# Patient Record
Sex: MALE
[De-identification: names, ages, dates, MRNs, and addresses within clinical notes are randomized per-mention and may not be internally consistent; named-entity substitution may affect disease eponyms.]

---

## 2020-11-20 ENCOUNTER — NURSE TRIAGE (OUTPATIENT)
Dept: OTHER | Facility: CLINIC | Age: 65
End: 2020-11-20

## 2020-11-20 NOTE — TELEPHONE ENCOUNTER
Patient called in via the [address] [facility] to report symptoms of bilateral leg/ankle injury. States he has spoken with his PCP and he has directed the patient to go to ER for further evaluation at this time. Duplicate call, call not triaged. Attention provider: Your patient utilized nurse triage services offered by employer, payer or community. This encounter includes an overview of the reason for call, assessment and recommended disposition. Please do not respond through this encounter as the response is not directed to a shared pool.         Reason for Disposition   Caller has already spoken with the PCP (or office), and has no further questions    Protocols used: NO CONTACT OR DUPLICATE CONTACT CALL-ADULT-[place]

## 2021-08-08 ENCOUNTER — NURSE TRIAGE (OUTPATIENT)
Dept: OTHER | Facility: CLINIC | Age: 66
End: 2021-08-08

## 2021-08-09 NOTE — TELEPHONE ENCOUNTER
Reason for Disposition   [1] Systolic BP  >= 162 OR Diastolic >= 989 AND [7] cardiac or neurologic symptoms (e.g., chest pain, difficulty breathing, unsteady gait, blurred vision)    Answer Assessment - Initial Assessment Questions  1. BLOOD PRESSURE: \"What is the blood pressure? \" \"Did you take at least two measurements 5 minutes apart?\"  237/182 a few minutes ago  and 166/103 1-2 hours ago    2. ONSET: \"When did you take your blood pressure? \"   a few minutes ago    3. HOW: \"How did you obtain the blood pressure? \" (e.g., visiting nurse, automatic home BP monitor)  Home monitor    4. HISTORY: \"Do you have a history of high blood pressure? \"  Yes    5. MEDICATIONS: Farnaz Mayorga you taking any medications for blood pressure? \" \"Have you missed any doses recently? \"   amlodipine, no missed doses, but does was increased from 5mg to 10mg by PCP due to increased BP    6. OTHER SYMPTOMS: \"Do you have any symptoms? \" (e.g., headache, chest pain, blurred vision, difficulty breathing, weakness)    Headache    7. PREGNANCY: \"Is there any chance you are pregnant? \" \"When was your last menstrual period? \"  n/a    Protocols used: HIGH BLOOD PRESSURE-ADULT-    Brief description of triage: elevated BP, is on medication, no missed doses 237/182 and has a headache     Triage indicates for patient to go to ED now, however caller states he will wait to see how he feels and if BP has a chance to go down some. Advised to caller to reconsider going to ED asap. Care advice provided, patient verbalizes understanding; denies any other questions or concerns; instructed to call back for any new or worsening symptoms. This triage is a result of a call to [address] [place] [address]. Please do not respond to the triage nurse through this encounter. Any subsequent communication should be directly with the patient.